# Patient Record
Sex: FEMALE | Race: WHITE | Employment: STUDENT | URBAN - METROPOLITAN AREA
[De-identification: names, ages, dates, MRNs, and addresses within clinical notes are randomized per-mention and may not be internally consistent; named-entity substitution may affect disease eponyms.]

---
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
## 2024-03-26 ENCOUNTER — OFFICE VISIT (OUTPATIENT)
Dept: PRIMARY CARE | Facility: CLINIC | Age: 19
End: 2024-03-26
Payer: COMMERCIAL

## 2024-03-26 VITALS
HEART RATE: 97 BPM | DIASTOLIC BLOOD PRESSURE: 60 MMHG | WEIGHT: 106.6 LBS | SYSTOLIC BLOOD PRESSURE: 102 MMHG | BODY MASS INDEX: 20.14 KG/M2 | TEMPERATURE: 98.8 F

## 2024-03-26 DIAGNOSIS — J02.9 SORE THROAT: Primary | ICD-10-CM

## 2024-03-26 LAB — POC RAPID STREP: NEGATIVE

## 2024-03-26 PROCEDURE — 99214 OFFICE O/P EST MOD 30 MIN: CPT | Performed by: FAMILY MEDICINE

## 2024-03-26 PROCEDURE — 87880 STREP A ASSAY W/OPTIC: CPT | Performed by: FAMILY MEDICINE

## 2024-03-26 PROCEDURE — 1036F TOBACCO NON-USER: CPT | Performed by: FAMILY MEDICINE

## 2024-03-26 RX ORDER — AMOXICILLIN 500 MG/1
500 CAPSULE ORAL 2 TIMES DAILY
Qty: 14 CAPSULE | Refills: 0 | Status: CANCELLED | OUTPATIENT
Start: 2024-03-26 | End: 2024-04-02

## 2024-03-26 ASSESSMENT — ENCOUNTER SYMPTOMS
ARTHRALGIAS: 0
BACK PAIN: 0
APPETITE CHANGE: 0
FATIGUE: 0
DIZZINESS: 0
PALPITATIONS: 0
CHEST TIGHTNESS: 0
COUGH: 0
SORE THROAT: 1
FACIAL ASYMMETRY: 0
CHOKING: 0
ACTIVITY CHANGE: 0
FEVER: 0
HEADACHES: 0
COLOR CHANGE: 0
LIGHT-HEADEDNESS: 0

## 2024-03-26 ASSESSMENT — PATIENT HEALTH QUESTIONNAIRE - PHQ9
2. FEELING DOWN, DEPRESSED OR HOPELESS: NOT AT ALL
10. IF YOU CHECKED OFF ANY PROBLEMS, HOW DIFFICULT HAVE THESE PROBLEMS MADE IT FOR YOU TO DO YOUR WORK, TAKE CARE OF THINGS AT HOME, OR GET ALONG WITH OTHER PEOPLE: NOT DIFFICULT AT ALL
SUM OF ALL RESPONSES TO PHQ9 QUESTIONS 1 AND 2: 0
1. LITTLE INTEREST OR PLEASURE IN DOING THINGS: NOT AT ALL

## 2024-03-26 NOTE — PROGRESS NOTES
Subjective   Patient ID: Nellie Hawkins is a 19 y.o. female who presents for Red bumps in back of throat.  (X 3 weeks. ).    HPI   Patient reports she has had difficulty with some red bumps inside of her throat.  Denies any nausea vomit diarrhea constipation denies any chest pain shortness of breath syncopal episodes or palpitations.    Review of Systems   Constitutional:  Negative for activity change, appetite change, fatigue and fever.   HENT:  Positive for sore throat. Negative for congestion, ear discharge and ear pain.    Respiratory:  Negative for cough, choking and chest tightness.    Cardiovascular:  Negative for chest pain, palpitations and leg swelling.   Musculoskeletal:  Negative for arthralgias, back pain and gait problem.   Skin:  Negative for color change and pallor.   Neurological:  Negative for dizziness, facial asymmetry, light-headedness and headaches.       Objective   /60 (BP Location: Left arm)   Pulse 97   Temp 37.1 °C (98.8 °F)   Wt 48.4 kg (106 lb 9.6 oz)   BMI 20.14 kg/m²   BSA Body surface area is 1.44 meters squared.      Physical Exam  Constitutional:       General: She is not in acute distress.     Appearance: Normal appearance. She is not toxic-appearing.   HENT:      Head: Normocephalic.      Right Ear: Tympanic membrane, ear canal and external ear normal.      Left Ear: Tympanic membrane, ear canal and external ear normal.      Mouth/Throat:      Pharynx: Oropharynx is clear. Posterior oropharyngeal erythema present.      Comments: Small amount of tonsillar tissue has regrown on both sides of patient's tonsillar palate, small bumps are seen on these areas  Eyes:      Conjunctiva/sclera: Conjunctivae normal.      Pupils: Pupils are equal, round, and reactive to light.   Cardiovascular:      Rate and Rhythm: Normal rate and regular rhythm.      Pulses: Normal pulses.      Heart sounds: Normal heart sounds.   Pulmonary:      Effort: No respiratory distress.      Breath sounds:  No wheezing, rhonchi or rales.   Musculoskeletal:         General: No swelling or tenderness.   Skin:     Findings: No lesion or rash.   Neurological:      General: No focal deficit present.      Mental Status: She is alert and oriented to person, place, and time. Mental status is at baseline.      Gait: Gait normal.   Psychiatric:         Mood and Affect: Mood normal.         Behavior: Behavior normal.         Thought Content: Thought content normal.         Judgment: Judgment normal.       No visits with results within 1 Year(s) from this visit.   Latest known visit with results is:   No results found for any previous visit.     No current outpatient medications on file prior to visit.     No current facility-administered medications on file prior to visit.     No images are attached to the encounter.            Assessment/Plan   Diagnoses and all orders for this visit:  Sore throat    1.  Patient to hold off on any antibiotics, continue to take a teaspoon of  honey if they are bothering her  2.  Patient to call for questions or concerns

## 2024-04-19 ENCOUNTER — OFFICE VISIT (OUTPATIENT)
Dept: PRIMARY CARE | Facility: CLINIC | Age: 19
End: 2024-04-19
Payer: COMMERCIAL

## 2024-04-19 VITALS
WEIGHT: 108 LBS | TEMPERATURE: 98.6 F | DIASTOLIC BLOOD PRESSURE: 63 MMHG | BODY MASS INDEX: 20.41 KG/M2 | OXYGEN SATURATION: 98 % | SYSTOLIC BLOOD PRESSURE: 100 MMHG | RESPIRATION RATE: 16 BRPM | HEART RATE: 88 BPM

## 2024-04-19 DIAGNOSIS — H93.8X3 SENSATION OF FULLNESS IN BOTH EARS: Primary | ICD-10-CM

## 2024-04-19 ASSESSMENT — ENCOUNTER SYMPTOMS
FEVER: 0
VOMITING: 0
SINUS PAIN: 0
SHORTNESS OF BREATH: 0
COUGH: 0
NAUSEA: 0
ABDOMINAL PAIN: 0
DIARRHEA: 0
RHINORRHEA: 1
CHILLS: 0

## 2024-04-19 NOTE — PROGRESS NOTES
Subjective   Chief Complaint: bumps in back of throat  (Not sore,  had tonsilectomy when she was 3 years old) and ear discomfort.    HPI   Nellie Hawkins is a 19 y.o. female who presents for bumps in back of throat  (Not sore,  had tonsilectomy when she was 3 years old) and ear discomfort.    Patient presents with couple days of sinus congestion and now reports with bilateral fullness in ear.     Patient denies fever, chills, nausea, vomiting, diarrhea, chest pain, heart palpations, or shortness of breath.     Has not been using anything OTC     ENT apt with Dr. Tomas in 2 months scheduled     Review of Systems   Constitutional:  Negative for chills and fever.   HENT:  Positive for congestion, postnasal drip and rhinorrhea. Negative for sinus pain.    Respiratory:  Negative for cough and shortness of breath.    Cardiovascular:  Negative for chest pain.   Gastrointestinal:  Negative for abdominal pain, diarrhea, nausea and vomiting.       Objective   /63   Pulse 88   Temp 37 °C (98.6 °F)   Resp 16   Wt 49 kg (108 lb)   SpO2 98%   BMI 20.41 kg/m²   BSA Body surface area is 1.45 meters squared.      Physical Exam  Constitutional:       Appearance: Normal appearance.   Cardiovascular:      Rate and Rhythm: Normal rate and regular rhythm.   Pulmonary:      Effort: Pulmonary effort is normal.      Breath sounds: Normal breath sounds.   Abdominal:      General: Abdomen is flat.      Palpations: Abdomen is soft.   Neurological:      Mental Status: She is alert.       Office Visit on 03/26/2024   Component Date Value Ref Range Status    POC Rapid Strep 03/26/2024 Negative  Negative Final     No current outpatient medications on file prior to visit.     No current facility-administered medications on file prior to visit.     No images are attached to the encounter.            Assessment/Plan

## 2024-04-19 NOTE — PATIENT INSTRUCTIONS
Start taking over the counter antihistamine   Start using cool mist humidifier at nighttime  Follow up with ENT as scheduled

## 2024-11-12 ENCOUNTER — TELEPHONE (OUTPATIENT)
Dept: PRIMARY CARE | Facility: CLINIC | Age: 19
End: 2024-11-12
Payer: COMMERCIAL

## 2024-11-14 DIAGNOSIS — R53.83 OTHER FATIGUE: ICD-10-CM

## 2024-11-14 DIAGNOSIS — Z00.00 HEALTHCARE MAINTENANCE: ICD-10-CM

## 2025-01-06 ENCOUNTER — APPOINTMENT (OUTPATIENT)
Dept: PRIMARY CARE | Facility: CLINIC | Age: 20
End: 2025-01-06
Payer: COMMERCIAL

## 2025-03-10 ENCOUNTER — APPOINTMENT (OUTPATIENT)
Dept: PRIMARY CARE | Facility: CLINIC | Age: 20
End: 2025-03-10
Payer: COMMERCIAL

## 2025-03-12 ASSESSMENT — ENCOUNTER SYMPTOMS
CHOKING: 0
ACTIVITY CHANGE: 0
PALPITATIONS: 0
FEVER: 0
COUGH: 0
HEADACHES: 0
FACIAL ASYMMETRY: 0
COLOR CHANGE: 0
DIZZINESS: 0
BACK PAIN: 0
LIGHT-HEADEDNESS: 0
APPETITE CHANGE: 0
CHEST TIGHTNESS: 0
ARTHRALGIAS: 0
FATIGUE: 0

## 2025-03-12 NOTE — PROGRESS NOTES
"Subjective   Patient ID: Nellie Hawkins is a 20 y.o. female who presents for Annual Exam.    HPI   Patient presents for physical exam.    Fam Hx  Mom () living,   Dad () living,     Exercise walks  ETOH denies  Caffeine denies  Tobacco denies    Mammogram @ 39  DEXA @ 64  Colonoscopy @ 44    Patient denies other complaints.  Review of Systems   Constitutional:  Negative for activity change, appetite change, fatigue and fever.   HENT:  Negative for congestion.    Respiratory:  Negative for cough, choking and chest tightness.    Cardiovascular:  Negative for chest pain, palpitations and leg swelling.   Musculoskeletal:  Negative for arthralgias, back pain and gait problem.   Skin:  Negative for color change and pallor.   Neurological:  Negative for dizziness, facial asymmetry, light-headedness and headaches.       Objective   /62 (BP Location: Right arm, Patient Position: Sitting, BP Cuff Size: Adult)   Pulse 98   Resp 16   Ht 1.568 m (5' 1.75\")   Wt 49.5 kg (109 lb 3.2 oz)   SpO2 100%   BMI 20.13 kg/m²   BSA Body surface area is 1.47 meters squared.      Physical Exam  Constitutional:       General: She is not in acute distress.     Appearance: Normal appearance. She is not toxic-appearing.   HENT:      Head: Normocephalic.      Right Ear: Tympanic membrane, ear canal and external ear normal.      Left Ear: Tympanic membrane, ear canal and external ear normal.      Nose: Nose normal.      Mouth/Throat:      Pharynx: Oropharynx is clear.   Eyes:      Conjunctiva/sclera: Conjunctivae normal.      Pupils: Pupils are equal, round, and reactive to light.   Cardiovascular:      Rate and Rhythm: Normal rate and regular rhythm.      Pulses: Normal pulses.      Heart sounds: Normal heart sounds.   Pulmonary:      Effort: No respiratory distress.      Breath sounds: No wheezing, rhonchi or rales.   Abdominal:      General: Bowel sounds are normal. There is no distension.      Palpations: Abdomen is soft.      " Tenderness: There is no abdominal tenderness.   Musculoskeletal:         General: No swelling or tenderness.      Cervical back: No tenderness.   Skin:     Findings: No lesion or rash.   Neurological:      General: No focal deficit present.      Mental Status: She is alert and oriented to person, place, and time. Mental status is at baseline.      Gait: Gait normal.   Psychiatric:         Mood and Affect: Mood normal.         Behavior: Behavior normal.         Thought Content: Thought content normal.         Judgment: Judgment normal.       Orders Only on 03/13/2025   Component Date Value Ref Range Status    CHOLESTEROL, TOTAL 03/13/2025 196  <200 mg/dL Final    HDL CHOLESTEROL 03/13/2025 63  > OR = 50 mg/dL Final    TRIGLYCERIDES 03/13/2025 78  <150 mg/dL Final    LDL-CHOLESTEROL 03/13/2025 116 (H)  mg/dL (calc) Final    Comment: Reference range: <100     Desirable range <100 mg/dL for primary prevention;    <70 mg/dL for patients with CHD or diabetic patients   with > or = 2 CHD risk factors.     LDL-C is now calculated using the Vince-Corrie   calculation, which is a validated novel method providing   better accuracy than the Friedewald equation in the   estimation of LDL-C.   Vince ERICKSON et al. JEANMARIE. 2013;310(19): 4456-7871   (http://education.[employer].com/faq/VAR789)      CHOL/HDLC RATIO 03/13/2025 3.1  <5.0 (calc) Final    NON HDL CHOLESTEROL 03/13/2025 133 (H)  <130 mg/dL (calc) Final    Comment: For patients with diabetes plus 1 major ASCVD risk   factor, treating to a non-HDL-C goal of <100 mg/dL   (LDL-C of <70 mg/dL) is considered a therapeutic   option.      GLUCOSE 03/13/2025 89  65 - 99 mg/dL Final    Comment:               Fasting reference interval         UREA NITROGEN (BUN) 03/13/2025 11  7 - 25 mg/dL Final    CREATININE 03/13/2025 0.63  0.50 - 0.96 mg/dL Final    EGFR 03/13/2025 129  > OR = 60 mL/min/1.73m2 Final    SODIUM 03/13/2025 140  135 - 146 mmol/L Final    POTASSIUM 03/13/2025 3.8   3.5 - 5.3 mmol/L Final    CHLORIDE 03/13/2025 104  98 - 110 mmol/L Final    CARBON DIOXIDE 03/13/2025 25  20 - 32 mmol/L Final    ELECTROLYTE BALANCE 03/13/2025 11  7 - 17 mmol/L (calc) Final    CALCIUM 03/13/2025 9.6  8.6 - 10.2 mg/dL Final    PROTEIN, TOTAL 03/13/2025 7.1  6.1 - 8.1 g/dL Final    ALBUMIN 03/13/2025 4.6  3.6 - 5.1 g/dL Final    BILIRUBIN, TOTAL 03/13/2025 0.7  0.2 - 1.2 mg/dL Final    ALKALINE PHOSPHATASE 03/13/2025 52  31 - 125 U/L Final    AST 03/13/2025 19  10 - 30 U/L Final    ALT 03/13/2025 14  6 - 29 U/L Final    WHITE BLOOD CELL COUNT 03/13/2025 7.8  3.8 - 10.8 Thousand/uL Final    RED BLOOD CELL COUNT 03/13/2025 5.18 (H)  3.80 - 5.10 Million/uL Final    HEMOGLOBIN 03/13/2025 14.4  11.7 - 15.5 g/dL Final    HEMATOCRIT 03/13/2025 44.8  35.0 - 45.0 % Final    MCV 03/13/2025 86.5  80.0 - 100.0 fL Final    MCH 03/13/2025 27.8  27.0 - 33.0 pg Final    MCHC 03/13/2025 32.1  32.0 - 36.0 g/dL Final    Comment: For adults, a slight decrease in the calculated MCHC  value (in the range of 30 to 32 g/dL) is most likely  not clinically significant; however, it should be  interpreted with caution in correlation with other  red cell parameters and the patient's clinical  condition.      RDW 03/13/2025 12.0  11.0 - 15.0 % Final    PLATELET COUNT 03/13/2025 322  140 - 400 Thousand/uL Final    MPV 03/13/2025 9.3  7.5 - 12.5 fL Final    ABSOLUTE NEUTROPHILS 03/13/2025 5,039  1,500 - 7,800 cells/uL Final    ABSOLUTE LYMPHOCYTES 03/13/2025 2,122  850 - 3,900 cells/uL Final    ABSOLUTE MONOCYTES 03/13/2025 515  200 - 950 cells/uL Final    ABSOLUTE EOSINOPHILS 03/13/2025 78  15 - 500 cells/uL Final    ABSOLUTE BASOPHILS 03/13/2025 47  0 - 200 cells/uL Final    NEUTROPHILS 03/13/2025 64.6  % Final    LYMPHOCYTES 03/13/2025 27.2  % Final    MONOCYTES 03/13/2025 6.6  % Final    EOSINOPHILS 03/13/2025 1.0  % Final    BASOPHILS 03/13/2025 0.6  % Final    TSH W/REFLEX TO FT4 03/13/2025 1.59  mIU/L Final    Comment:            Reference Range                         > or = 20 Years  0.40-4.50                              Pregnancy Ranges            First trimester    0.26-2.66            Second trimester   0.55-2.73            Third trimester    0.43-2.91      VITAMIN D,25-OH,TOTAL,IA 03/13/2025 25 (L)  30 - 100 ng/mL Final    Comment: Vitamin D Status         25-OH Vitamin D:     Deficiency:                    <20 ng/mL  Insufficiency:             20 - 29 ng/mL  Optimal:                 > or = 30 ng/mL     For 25-OH Vitamin D testing on patients on   D2-supplementation and patients for whom quantitation   of D2 and D3 fractions is required, the QuestAssureD(TM)  25-OH VIT D, (D2,D3), LC/MS/MS is recommended: order   code 60289 (patients >2yrs).     See Note 1     Note 1     For additional information, please refer to   http://education.[employer]/faq/WMZ232   (This link is being provided for informational/  educational purposes only.)     Office Visit on 03/26/2024   Component Date Value Ref Range Status    POC Rapid Strep 03/26/2024 Negative  Negative Final     No current outpatient medications on file prior to visit.     No current facility-administered medications on file prior to visit.     No images are attached to the encounter.            Assessment/Plan   Diagnoses and all orders for this visit:  Healthcare maintenance  Vitamin D deficiency      1.  Patient's blood work discussed at this office visit  2.  Patient's LDL goal less than 130 current   3.  Patient's vitamin D level was low start on vitamin D3 2000 units daily  4.  Mammogram @ 39  5.  DEXA @ 64  6.  Colonoscopy @ 44  7.  Patient to call questions or concerns

## 2025-03-14 ENCOUNTER — APPOINTMENT (OUTPATIENT)
Dept: PRIMARY CARE | Facility: CLINIC | Age: 20
End: 2025-03-14
Payer: COMMERCIAL

## 2025-03-14 VITALS
SYSTOLIC BLOOD PRESSURE: 102 MMHG | OXYGEN SATURATION: 100 % | BODY MASS INDEX: 20.09 KG/M2 | WEIGHT: 109.2 LBS | RESPIRATION RATE: 16 BRPM | HEART RATE: 98 BPM | HEIGHT: 62 IN | DIASTOLIC BLOOD PRESSURE: 62 MMHG

## 2025-03-14 DIAGNOSIS — Z00.00 HEALTHCARE MAINTENANCE: Primary | ICD-10-CM

## 2025-03-14 DIAGNOSIS — E55.9 VITAMIN D DEFICIENCY: ICD-10-CM

## 2025-03-14 LAB
25(OH)D3+25(OH)D2 SERPL-MCNC: 25 NG/ML (ref 30–100)
ALBUMIN SERPL-MCNC: 4.6 G/DL (ref 3.6–5.1)
ALP SERPL-CCNC: 52 U/L (ref 31–125)
ALT SERPL-CCNC: 14 U/L (ref 6–29)
ANION GAP SERPL CALCULATED.4IONS-SCNC: 11 MMOL/L (CALC) (ref 7–17)
AST SERPL-CCNC: 19 U/L (ref 10–30)
BASOPHILS # BLD AUTO: 47 CELLS/UL (ref 0–200)
BASOPHILS NFR BLD AUTO: 0.6 %
BILIRUB SERPL-MCNC: 0.7 MG/DL (ref 0.2–1.2)
BUN SERPL-MCNC: 11 MG/DL (ref 7–25)
CALCIUM SERPL-MCNC: 9.6 MG/DL (ref 8.6–10.2)
CHLORIDE SERPL-SCNC: 104 MMOL/L (ref 98–110)
CHOLEST SERPL-MCNC: 196 MG/DL
CHOLEST/HDLC SERPL: 3.1 (CALC)
CO2 SERPL-SCNC: 25 MMOL/L (ref 20–32)
CREAT SERPL-MCNC: 0.63 MG/DL (ref 0.5–0.96)
EGFRCR SERPLBLD CKD-EPI 2021: 129 ML/MIN/1.73M2
EOSINOPHIL # BLD AUTO: 78 CELLS/UL (ref 15–500)
EOSINOPHIL NFR BLD AUTO: 1 %
ERYTHROCYTE [DISTWIDTH] IN BLOOD BY AUTOMATED COUNT: 12 % (ref 11–15)
GLUCOSE SERPL-MCNC: 89 MG/DL (ref 65–99)
HCT VFR BLD AUTO: 44.8 % (ref 35–45)
HDLC SERPL-MCNC: 63 MG/DL
HGB BLD-MCNC: 14.4 G/DL (ref 11.7–15.5)
LDLC SERPL CALC-MCNC: 116 MG/DL (CALC)
LYMPHOCYTES # BLD AUTO: 2122 CELLS/UL (ref 850–3900)
LYMPHOCYTES NFR BLD AUTO: 27.2 %
MCH RBC QN AUTO: 27.8 PG (ref 27–33)
MCHC RBC AUTO-ENTMCNC: 32.1 G/DL (ref 32–36)
MCV RBC AUTO: 86.5 FL (ref 80–100)
MONOCYTES # BLD AUTO: 515 CELLS/UL (ref 200–950)
MONOCYTES NFR BLD AUTO: 6.6 %
NEUTROPHILS # BLD AUTO: 5039 CELLS/UL (ref 1500–7800)
NEUTROPHILS NFR BLD AUTO: 64.6 %
NONHDLC SERPL-MCNC: 133 MG/DL (CALC)
PLATELET # BLD AUTO: 322 THOUSAND/UL (ref 140–400)
PMV BLD REES-ECKER: 9.3 FL (ref 7.5–12.5)
POTASSIUM SERPL-SCNC: 3.8 MMOL/L (ref 3.5–5.3)
PROT SERPL-MCNC: 7.1 G/DL (ref 6.1–8.1)
RBC # BLD AUTO: 5.18 MILLION/UL (ref 3.8–5.1)
SODIUM SERPL-SCNC: 140 MMOL/L (ref 135–146)
TRIGL SERPL-MCNC: 78 MG/DL
TSH SERPL-ACNC: 1.59 MIU/L
WBC # BLD AUTO: 7.8 THOUSAND/UL (ref 3.8–10.8)

## 2025-03-14 PROCEDURE — 99395 PREV VISIT EST AGE 18-39: CPT | Performed by: FAMILY MEDICINE

## 2025-03-14 PROCEDURE — 1036F TOBACCO NON-USER: CPT | Performed by: FAMILY MEDICINE

## 2025-03-14 PROCEDURE — 3008F BODY MASS INDEX DOCD: CPT | Performed by: FAMILY MEDICINE

## 2025-03-14 ASSESSMENT — PATIENT HEALTH QUESTIONNAIRE - PHQ9
SUM OF ALL RESPONSES TO PHQ9 QUESTIONS 1 AND 2: 0
2. FEELING DOWN, DEPRESSED OR HOPELESS: NOT AT ALL
1. LITTLE INTEREST OR PLEASURE IN DOING THINGS: NOT AT ALL

## 2025-03-14 ASSESSMENT — COLUMBIA-SUICIDE SEVERITY RATING SCALE - C-SSRS
6. HAVE YOU EVER DONE ANYTHING, STARTED TO DO ANYTHING, OR PREPARED TO DO ANYTHING TO END YOUR LIFE?: NO
1. IN THE PAST MONTH, HAVE YOU WISHED YOU WERE DEAD OR WISHED YOU COULD GO TO SLEEP AND NOT WAKE UP?: NO
2. HAVE YOU ACTUALLY HAD ANY THOUGHTS OF KILLING YOURSELF?: NO